# Patient Record
Sex: MALE | Race: WHITE | Employment: STUDENT | URBAN - METROPOLITAN AREA
[De-identification: names, ages, dates, MRNs, and addresses within clinical notes are randomized per-mention and may not be internally consistent; named-entity substitution may affect disease eponyms.]

---
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
## 2022-06-29 ENCOUNTER — HOSPITAL ENCOUNTER (EMERGENCY)
Age: 22
Discharge: HOME | End: 2022-06-29
Attending: EMERGENCY MEDICINE
Payer: COMMERCIAL

## 2022-06-29 VITALS
HEART RATE: 85 BPM | RESPIRATION RATE: 18 BRPM | SYSTOLIC BLOOD PRESSURE: 146 MMHG | WEIGHT: 310 LBS | OXYGEN SATURATION: 98 % | DIASTOLIC BLOOD PRESSURE: 98 MMHG | TEMPERATURE: 98 F | BODY MASS INDEX: 43.4 KG/M2 | HEIGHT: 71 IN

## 2022-06-29 DIAGNOSIS — L02.216 ABSCESS, UMBILICAL: Primary | ICD-10-CM

## 2022-06-29 LAB — GLUCOSE BLD-MCNC: 93 MG/DL (ref 70–99)

## 2022-06-29 PROCEDURE — 99283 EMERGENCY DEPT VISIT LOW MDM: CPT

## 2022-06-29 PROCEDURE — 82962 GLUCOSE BLOOD TEST: CPT

## 2022-06-29 RX ORDER — AMOXICILLIN AND CLAVULANATE POTASSIUM 875; 125 MG/1; MG/1
1 TABLET, FILM COATED ORAL 2 TIMES DAILY
Qty: 20 TABLET | Refills: 0 | Status: SHIPPED | OUTPATIENT
Start: 2022-06-29 | End: 2022-07-09

## 2025-05-23 ENCOUNTER — HOSPITAL ENCOUNTER (EMERGENCY)
Age: 25
Discharge: HOME | End: 2025-05-23
Attending: EMERGENCY MEDICINE

## 2025-05-23 ENCOUNTER — APPOINTMENT (OUTPATIENT)
Dept: GENERAL RADIOLOGY | Age: 25
End: 2025-05-23
Attending: EMERGENCY MEDICINE

## 2025-05-23 VITALS
DIASTOLIC BLOOD PRESSURE: 82 MMHG | BODY MASS INDEX: 43.4 KG/M2 | HEIGHT: 71 IN | RESPIRATION RATE: 16 BRPM | OXYGEN SATURATION: 99 % | HEART RATE: 81 BPM | SYSTOLIC BLOOD PRESSURE: 138 MMHG | WEIGHT: 310 LBS | TEMPERATURE: 98 F

## 2025-05-23 DIAGNOSIS — S83.92XA SPRAIN OF LEFT KNEE, UNSPECIFIED LIGAMENT, INITIAL ENCOUNTER: Primary | ICD-10-CM

## 2025-05-23 PROCEDURE — 99284 EMERGENCY DEPT VISIT MOD MDM: CPT

## 2025-05-23 PROCEDURE — 73560 X-RAY EXAM OF KNEE 1 OR 2: CPT | Performed by: EMERGENCY MEDICINE

## 2025-05-23 PROCEDURE — 99283 EMERGENCY DEPT VISIT LOW MDM: CPT

## 2025-05-23 RX ORDER — IBUPROFEN 600 MG/1
600 TABLET, FILM COATED ORAL ONCE
Status: DISCONTINUED | OUTPATIENT
Start: 2025-05-23 | End: 2025-05-23

## 2025-05-24 NOTE — ED INITIAL ASSESSMENT (HPI)
Injured left knee playing soccer, pt stated he came to an abrupt stop and felt a snap in left knee, knee gives out when pt is trying to stand. No meds pta.

## 2025-05-24 NOTE — ED PROVIDER NOTES
Patient Seen in: [facility] In [place]        History  Chief Complaint   Patient presents with    Leg or Foot Injury     Stated Complaint: left knee injury while playing soccer.    Subjective:   HPI            Patient was playing soccer and describes twisting on his leg while his foot was planted.  Patient felt the leg give way.  Ever since then, he has had painful weightbearing.  There is not much discomfort with passive range of motion.  No numbness or weakness of the leg.  He has not had issues with that knee before      Objective:     History reviewed. No pertinent past medical history.           Past Surgical History:   Procedure Laterality Date    Tonsillectomy                  Social History     Socioeconomic History    Marital status: Single   Tobacco Use    Smoking status: Passive Smoke Exposure - Never Smoker    Smokeless tobacco: Never   Vaping Use    Vaping status: Never Used   Substance and Sexual Activity    Alcohol use: Never    Drug use: Never                                Physical Exam    ED Triage Vitals [05/23/25 2036]   /87   Pulse 105   Resp 15   Temp 97.9 °F (36.6 °C)   Temp src Temporal   SpO2 100 %   O2 Device None (Room air)       Current Vitals:   Vital Signs  BP: 146/87  Pulse: 105  Resp: 15  Temp: 97.9 °F (36.6 °C)  Temp src: Temporal    Oxygen Therapy  SpO2: 100 %  O2 Device: None (Room air)            Physical Exam  Knee: On inspection, no extraordinary soft tissue swelling is noted.  Range of motion is full but seems mildly tender at extremes.  Slight effusion is palpable.  No gross instability.  No extraordinary tenderness over the medial or lateral joint space.  No point tenderness over the patella.  Calves are nonswollen, symmetric, nontender.  Distal  sensation intact        ED Course  Labs Reviewed - No data to display                         MDM     Patient complains of his knee giving out and painful weightbearing since pivoting on his leg playing  soccer.  I suspect sprain and possible meniscus injury.  Fracture would seem unlikely with this sort of presentation    X-ray knee  I personally reviewed the actual radiographs themselves and my individual interpretation shows no fracture  radiologist's formal interpretation which I have reviewed    CONCLUSION:  No acute findings.            I recommend  Crutches with no weightbearing  Rest  Elevate your injured extremity  Apply cool compresses for 20 minutes at a time.  Tylenol or motrin for pain     Follow up with your doctor within a few days            Medical Decision Making      Disposition and Plan     Clinical Impression:  1. Sprain of left knee, unspecified ligament, initial encounter         Disposition:  Discharge  5/23/2025  9:10 pm    Follow-up:  Terrance Campos MD  [address] [id number] [phone]    Call in 1 day(s)            Medications Prescribed:  There are no discharge medications for this patient.            Supplementary Documentation:

## 2025-05-24 NOTE — DISCHARGE INSTRUCTIONS
Crutches with no weightbearing  Rest  Elevate your injured extremity  Apply cool compresses for 20 minutes at a time.  Tylenol or motrin for pain    Follow up with your doctor within a few days

## (undated) NOTE — LETTER
Date & Time: 5/23/2025, 9:31 PM  Patient: Moses Lowe  Encounter Provider(s):    Ramo Sawyer MD       To Whom It May Concern:    Moses Lowe was seen and treated in our department on 5/23/2025.    If you have any questions or concerns, please do not hesitate to call.        _____________________________  Physician/APC Signature

## (undated) NOTE — ED AVS SNAPSHOT
Fredy Haleighfranky   MRN: [id number]    Department:  THE [facility] Emergency Department in [place]   Date of Visit:  3/13/2018           Disclosure     Insurance plans vary and the physician(s) referred by the ER may not be covered by your plan.  Please conta tell this physician (or your personal doctor if your instructions are to return to your personal doctor) about any new or lasting problems. The primary care or specialist physician will see patients referred from the [facility] Emergency Department.  Ras Pedraza

## (undated) NOTE — LETTER
Date & Time: 5/23/2025, 9:31 PM  Patient: Moses Lowe  Encounter Provider(s):    Ramo Sawyer MD       To Whom It May Concern:    Moses Lowe was seen and treated in our department on 5/23/2025. He {Return to school/sport/work:[id number]}.    If you have any questions or concerns, please do not hesitate to call.        _____________________________  Physician/APC Signature

## (undated) NOTE — LETTER
March 13, 2018    Patient: Yudith Bauer   Date of Visit: 3/13/2018       To Whom It May Concern:    Yudith Bauer was seen and treated in our emergency department on 3/13/2018. He should not participate in gym/sports until 3/20/2018.     If you h